# Patient Record
Sex: MALE | Race: WHITE | Employment: FULL TIME | URBAN - NONMETROPOLITAN AREA
[De-identification: names, ages, dates, MRNs, and addresses within clinical notes are randomized per-mention and may not be internally consistent; named-entity substitution may affect disease eponyms.]

---

## 2023-01-29 ENCOUNTER — HOSPITAL ENCOUNTER (EMERGENCY)
Age: 43
Discharge: HOME | End: 2023-01-30

## 2023-01-29 VITALS
WEIGHT: 200 LBS | BODY MASS INDEX: 29.62 KG/M2 | DIASTOLIC BLOOD PRESSURE: 88 MMHG | RESPIRATION RATE: 18 BRPM | HEART RATE: 97 BPM | TEMPERATURE: 98.7 F | SYSTOLIC BLOOD PRESSURE: 149 MMHG | OXYGEN SATURATION: 97 % | HEIGHT: 69 IN

## 2023-01-29 DIAGNOSIS — L03.115 CELLULITIS OF RIGHT LOWER EXTREMITY: Primary | ICD-10-CM

## 2023-01-29 LAB
ALBUMIN SERPL BCG-MCNC: 4.1 G/DL (ref 3.5–5.1)
ALP SERPL-CCNC: 79 U/L (ref 38–126)
ALT SERPL W/O P-5'-P-CCNC: 11 U/L (ref 11–66)
ANION GAP SERPL CALC-SCNC: 9 MEQ/L (ref 8–16)
AST SERPL-CCNC: 16 U/L (ref 5–40)
BASOPHILS ABSOLUTE: 0 THOU/MM3 (ref 0–0.1)
BASOPHILS NFR BLD AUTO: 0.4 %
BILIRUB SERPL-MCNC: < 0.2 MG/DL (ref 0.3–1.2)
BUN SERPL-MCNC: 15 MG/DL (ref 7–22)
CALCIUM SERPL-MCNC: 9.1 MG/DL (ref 8.5–10.5)
CHLORIDE SERPL-SCNC: 101 MEQ/L (ref 98–111)
CO2 SERPL-SCNC: 28 MEQ/L (ref 23–33)
CREAT SERPL-MCNC: 0.7 MG/DL (ref 0.4–1.2)
CRP SERPL-MCNC: 1.27 MG/DL (ref 0–1)
DEPRECATED RDW RBC AUTO: 40.1 FL (ref 35–45)
EOSINOPHIL NFR BLD AUTO: 5.3 %
EOSINOPHILS ABSOLUTE: 0.5 THOU/MM3 (ref 0–0.4)
ERYTHROCYTE [DISTWIDTH] IN BLOOD BY AUTOMATED COUNT: 13 % (ref 11.5–14.5)
GFR SERPL CREATININE-BSD FRML MDRD: > 60 ML/MIN/1.73M2
GLUCOSE SERPL-MCNC: 130 MG/DL (ref 70–108)
HCT VFR BLD AUTO: 41.9 % (ref 42–52)
HGB BLD-MCNC: 13.8 GM/DL (ref 14–18)
IMM GRANULOCYTES # BLD AUTO: 0.03 THOU/MM3 (ref 0–0.07)
IMM GRANULOCYTES NFR BLD AUTO: 0.4 %
LYMPHOCYTES ABSOLUTE: 1.8 THOU/MM3 (ref 1–4.8)
LYMPHOCYTES NFR BLD AUTO: 20.4 %
MCH RBC QN AUTO: 28.2 PG (ref 26–33)
MCHC RBC AUTO-ENTMCNC: 32.9 GM/DL (ref 32.2–35.5)
MCV RBC AUTO: 85.7 FL (ref 80–94)
MONOCYTES ABSOLUTE: 0.5 THOU/MM3 (ref 0.4–1.3)
MONOCYTES NFR BLD AUTO: 6.3 %
NEUTROPHILS NFR BLD AUTO: 67.2 %
NRBC BLD AUTO-RTO: 0 /100 WBC
OSMOLALITY SERPL CALC.SUM OF ELEC: 278.3 MOSMOL/KG (ref 275–300)
PLATELET # BLD AUTO: 240 THOU/MM3 (ref 130–400)
PMV BLD AUTO: 9.6 FL (ref 9.4–12.4)
POTASSIUM SERPL-SCNC: 3.9 MEQ/L (ref 3.5–5.2)
PROCALCITONIN SERPL IA-MCNC: 0.04 NG/ML (ref 0.01–0.09)
PROT SERPL-MCNC: 6.7 G/DL (ref 6.1–8)
RBC # BLD AUTO: 4.89 MILL/MM3 (ref 4.7–6.1)
SEGMENTED NEUTROPHILS ABSOLUTE COUNT: 5.8 THOU/MM3 (ref 1.8–7.7)
SODIUM SERPL-SCNC: 138 MEQ/L (ref 135–145)
URATE SERPL-MCNC: 5 MG/DL (ref 3.7–7)
WBC # BLD AUTO: 8.6 THOU/MM3 (ref 4.8–10.8)

## 2023-01-29 PROCEDURE — 85025 COMPLETE CBC W/AUTO DIFF WBC: CPT

## 2023-01-29 PROCEDURE — 85651 RBC SED RATE NONAUTOMATED: CPT

## 2023-01-29 PROCEDURE — 80053 COMPREHEN METABOLIC PANEL: CPT

## 2023-01-29 PROCEDURE — 96375 TX/PRO/DX INJ NEW DRUG ADDON: CPT

## 2023-01-29 PROCEDURE — 84550 ASSAY OF BLOOD/URIC ACID: CPT

## 2023-01-29 PROCEDURE — 84145 PROCALCITONIN (PCT): CPT

## 2023-01-29 PROCEDURE — 2580000003 HC RX 258: Performed by: PHYSICIAN ASSISTANT

## 2023-01-29 PROCEDURE — 86140 C-REACTIVE PROTEIN: CPT

## 2023-01-29 PROCEDURE — 6360000002 HC RX W HCPCS: Performed by: PHYSICIAN ASSISTANT

## 2023-01-29 PROCEDURE — 99284 EMERGENCY DEPT VISIT MOD MDM: CPT

## 2023-01-29 PROCEDURE — 36415 COLL VENOUS BLD VENIPUNCTURE: CPT

## 2023-01-29 RX ORDER — KETOROLAC TROMETHAMINE 30 MG/ML
30 INJECTION, SOLUTION INTRAMUSCULAR; INTRAVENOUS ONCE
Status: COMPLETED | OUTPATIENT
Start: 2023-01-29 | End: 2023-01-29

## 2023-01-29 RX ORDER — 0.9 % SODIUM CHLORIDE 0.9 %
1000 INTRAVENOUS SOLUTION INTRAVENOUS ONCE
Status: COMPLETED | OUTPATIENT
Start: 2023-01-29 | End: 2023-01-30

## 2023-01-29 RX ORDER — DEXAMETHASONE SODIUM PHOSPHATE 4 MG/ML
10 INJECTION, SOLUTION INTRA-ARTICULAR; INTRALESIONAL; INTRAMUSCULAR; INTRAVENOUS; SOFT TISSUE ONCE
Status: COMPLETED | OUTPATIENT
Start: 2023-01-29 | End: 2023-01-29

## 2023-01-29 RX ADMIN — DEXAMETHASONE SODIUM PHOSPHATE 10 MG: 4 INJECTION, SOLUTION INTRAMUSCULAR; INTRAVENOUS at 23:09

## 2023-01-29 RX ADMIN — SODIUM CHLORIDE 1000 ML: 9 INJECTION, SOLUTION INTRAVENOUS at 23:09

## 2023-01-29 RX ADMIN — KETOROLAC TROMETHAMINE 30 MG: 30 INJECTION, SOLUTION INTRAMUSCULAR; INTRAVENOUS at 23:09

## 2023-01-29 ASSESSMENT — ENCOUNTER SYMPTOMS
VOMITING: 0
SHORTNESS OF BREATH: 0
PHOTOPHOBIA: 0
RHINORRHEA: 0
COLOR CHANGE: 1
NAUSEA: 0

## 2023-01-29 ASSESSMENT — PAIN DESCRIPTION - LOCATION: LOCATION: FOOT

## 2023-01-29 ASSESSMENT — PAIN DESCRIPTION - ORIENTATION: ORIENTATION: RIGHT

## 2023-01-29 ASSESSMENT — PAIN - FUNCTIONAL ASSESSMENT: PAIN_FUNCTIONAL_ASSESSMENT: 0-10

## 2023-01-29 ASSESSMENT — PAIN SCALES - GENERAL: PAINLEVEL_OUTOF10: 10

## 2023-01-29 NOTE — Clinical Note
Felix Aguilar was seen and treated in our emergency department on 1/29/2023. He may return to work on 02/02/2023. If you have any questions or concerns, please don't hesitate to call.       Brittney Fernandez PA-C

## 2023-01-30 LAB — ERYTHROCYTE [SEDIMENTATION RATE] IN BLOOD BY WESTERGREN METHOD: 13 MM/HR (ref 0–10)

## 2023-01-30 PROCEDURE — 6360000002 HC RX W HCPCS: Performed by: PHYSICIAN ASSISTANT

## 2023-01-30 PROCEDURE — 96375 TX/PRO/DX INJ NEW DRUG ADDON: CPT

## 2023-01-30 PROCEDURE — 96365 THER/PROPH/DIAG IV INF INIT: CPT

## 2023-01-30 RX ORDER — MORPHINE SULFATE 4 MG/ML
4 INJECTION, SOLUTION INTRAMUSCULAR; INTRAVENOUS ONCE
Status: COMPLETED | OUTPATIENT
Start: 2023-01-30 | End: 2023-01-30

## 2023-01-30 RX ORDER — CEPHALEXIN 500 MG/1
500 CAPSULE ORAL 4 TIMES DAILY
Qty: 40 CAPSULE | Refills: 0 | Status: SHIPPED | OUTPATIENT
Start: 2023-01-30 | End: 2023-02-09

## 2023-01-30 RX ORDER — CEFAZOLIN SODIUM 2 G/100ML
2000 INJECTION, SOLUTION INTRAVENOUS ONCE
Status: DISCONTINUED | OUTPATIENT
Start: 2023-01-30 | End: 2023-01-30 | Stop reason: CLARIF

## 2023-01-30 RX ORDER — ONDANSETRON 2 MG/ML
4 INJECTION INTRAMUSCULAR; INTRAVENOUS ONCE
Status: COMPLETED | OUTPATIENT
Start: 2023-01-30 | End: 2023-01-30

## 2023-01-30 RX ORDER — SULFAMETHOXAZOLE AND TRIMETHOPRIM 800; 160 MG/1; MG/1
1 TABLET ORAL 2 TIMES DAILY
Qty: 20 TABLET | Refills: 0 | Status: SHIPPED | OUTPATIENT
Start: 2023-01-30 | End: 2023-02-09

## 2023-01-30 RX ORDER — HYDROCODONE BITARTRATE AND ACETAMINOPHEN 5; 325 MG/1; MG/1
1 TABLET ORAL EVERY 6 HOURS PRN
Qty: 15 TABLET | Refills: 0 | Status: SHIPPED | OUTPATIENT
Start: 2023-01-30 | End: 2023-02-04 | Stop reason: ALTCHOICE

## 2023-01-30 RX ADMIN — ONDANSETRON 4 MG: 2 INJECTION INTRAMUSCULAR; INTRAVENOUS at 00:17

## 2023-01-30 RX ADMIN — MORPHINE SULFATE 4 MG: 4 INJECTION, SOLUTION INTRAMUSCULAR; INTRAVENOUS at 00:17

## 2023-01-30 RX ADMIN — Medication 2000 MG: at 00:19

## 2023-01-30 NOTE — ED PROVIDER NOTES
[address] [facility] [address] EMERGENCY DEPT      Pt Name: Madhuri Noe  MRN: [id number]  [place] 1980  Date of evaluation: 1/29/2023  Provider: Mari Perez PA-C    CHIEF COMPLAINT       Chief Complaint   Patient presents with    Foot Swelling       Nurses Notes reviewed and I agree except as noted in the HPI. HISTORY OF PRESENT ILLNESS    Madhuri Noe is a 38 y.o. male who presents for right foot pain and swelling. Pain and swelling started 8 days ago originating in the first MTP joint. Yesterday the foot has become red and more swollen. Nurses that works with advised him to come to the ER for evaluation. Patient has been taking ibuprofen 800 mg regularly for the past few days with only minimal improvement. He is also been elevating and icing. Patient denies any injury or prior history of gout. Patient denies foreign body. Patient denies fever, chills, nausea, vomiting, paresthesias, or other complaints. REVIEW OF SYSTEMS     Review of Systems   Constitutional:  Negative for appetite change, chills, diaphoresis and fever. HENT:  Negative for rhinorrhea. Eyes:  Negative for photophobia. Respiratory:  Negative for shortness of breath. Cardiovascular:  Positive for leg swelling. Negative for chest pain. Gastrointestinal:  Negative for nausea and vomiting. Musculoskeletal:  Positive for arthralgias and gait problem. Skin:  Positive for color change. Negative for rash and wound. Neurological:  Negative for weakness and numbness. Psychiatric/Behavioral:  Negative for confusion. PAST MEDICAL HISTORY    has no past medical history on file. SURGICAL HISTORY      has a past surgical history that includes Tympanostomy tube placement.     CURRENT MEDICATIONS       Previous Medications    ALBUTEROL SULFATE HFA (PROVENTIL HFA) 108 (90 BASE) MCG/ACT INHALER    Inhale 2 puffs into the lungs every 4 hours as needed for Wheezing or Shortness of Breath (Space out to every 6 hours as symptoms improve) Space out to every 6 hours as symptoms improve. GABAPENTIN (NEURONTIN) 100 MG CAPSULE    Take 3 capsules by mouth 2 times daily       ALLERGIES     has No Known Allergies. FAMILY HISTORY     He indicated that his mother is alive. He indicated that his father is alive. family history includes Emphysema in his mother; High Blood Pressure in his father; High Cholesterol in his father. SOCIAL HISTORY    reports that he has been smoking cigarettes. He has been smoking an average of 1 pack per day. He has never used smokeless tobacco. He reports that he does not drink alcohol and does not use drugs. PHYSICAL EXAM     INITIAL VITALS:  height is 5' 9\" (1.753 m) and weight is 200 lb (90.7 kg). His oral temperature is 98.7 °F (37.1 °C). His blood pressure is 149/88 (abnormal) and his pulse is 97. His respiration is 18 and oxygen saturation is 97%. Physical Exam  Constitutional:       General: He is not in acute distress. Appearance: He is well-developed. He is not toxic-appearing. HENT:      Head: Normocephalic and atraumatic. Right Ear: Hearing normal.      Left Ear: Hearing normal.      Nose: Nose normal. No nasal deformity. Mouth/Throat:      Mouth: Mucous membranes are moist.      Pharynx: Oropharynx is clear. Eyes:      General: Lids are normal.      Extraocular Movements: Extraocular movements intact. Conjunctiva/sclera: Conjunctivae normal.   Neck:      Trachea: Trachea normal. No tracheal deviation. Cardiovascular:      Rate and Rhythm: Normal rate and regular rhythm. Pulses:           Dorsalis pedis pulses are 2+ on the right side and 2+ on the left side. Posterior tibial pulses are 2+ on the right side and 2+ on the left side. Pulmonary:      Effort: Pulmonary effort is normal. No tachypnea. Abdominal:      General: There is no distension. Palpations: Abdomen is soft. Musculoskeletal:      Cervical back: No rigidity.       Right lower leg: Swelling present. No tenderness. Right ankle: Swelling present. No tenderness. Normal range of motion. Right foot: Decreased range of motion. Swelling, tenderness and bony tenderness present. Left foot: Normal.        Feet:       Comments: Patient has swelling and scattered erythema to the right foot with erythema predominantly to the dorsum. The only tenderness elicited is over the first MTP joint. Swelling extends up to the calf. Skin:     General: Skin is warm and dry. Capillary Refill: Capillary refill takes less than 2 seconds. Coloration: Skin is not pale. Findings: No rash. Neurological:      Mental Status: He is alert. GCS: GCS eye subscore is 4. GCS verbal subscore is 5. GCS motor subscore is 6. Sensory: No sensory deficit. Motor: No weakness. Coordination: Coordination normal.      Gait: Gait normal.   Psychiatric:         Speech: Speech normal.         Behavior: Behavior normal. Behavior is cooperative. Thought Content: Thought content normal.             DIFFERENTIAL DIAGNOSIS:   Including but not limited to: Gout, Cellulitis, sprain, venous stasis    Diagnoses Considered but I have low suspicion of:   DVT, fracture, foreign body, osteomyelitis    DIAGNOSTIC RESULTS     EKG: All E[facility]'s are [facility] who either signs or Co-signs this chart in the absence of a cardiologist.  None    RADIOLOGY: non-plain film images(s) such as CT,Ultrasound and MRI are read by the radiologist.  Plain radiographic images are visualized and preliminarily interpreted by the emergency physician unless otherwise stated below.   No orders to display       LABS:   Labs Reviewed   CBC WITH AUTO DIFFERENTIAL - Abnormal; Notable for the following components:       Result Value    Hemoglobin 13.8 (*)     Hematocrit 41.9 (*)     Eosinophils Absolute 0.5 (*)     All other components within normal limits   COMPREHENSIVE METABOLIC PANEL - Abnormal; Notable for the following components:    Glucose 130 (*)     Total Bilirubin <0.2 (*)     All other components within normal limits   SEDIMENTATION RATE - Abnormal; Notable for the following components:    Sed Rate 13 (*)     All other components within normal limits   C-REACTIVE PROTEIN - Abnormal; Notable for the following components:    CRP 1.27 (*)     All other components within normal limits   URIC ACID   PROCALCITONIN   ANION GAP   GLOMERULAR FILTRATION RATE, ESTIMATED   OSMOLALITY       EMERGENCY DEPARTMENT COURSE:   Vitals:    Vitals:    01/29/23 2106   BP: (!) 149/88   Pulse: 97   Resp: 18   Temp: 98.7 °F (37.1 °C)   TempSrc: Oral   SpO2: 97%   Weight: 200 lb (90.7 kg)   Height: 5' 9\" (1.753 m)       MDM:  The patient was seen and evaluated by me in the intake area. Vital signs were reviewed and noted stable. Physical exam revealed swollen and erythematous right foot with swelling extending up to the calf. The only tenderness elicited was over the first MTP joint. The patient was neurovascularly intact. Loreatha Press Appropriate testing was ordered. Results were reviewed by me upon completion. Results showed normal white count and uric acid level. Results were discussed with the patient and discharge plan was discussed. Patient was medicated with Toradol and Decadron initially for possible gout. Patient had minimal improvement at best for pain. I discussed this patient with my attending physician Dr. Mead who advised we should treat this as cellulitis. Patient was given Ancef, morphine and Zofran. Crutches were supplied with instructions. Patient was comfortable plan of discharge home to follow-up with podiatry or at [facility]. Anticipatory guidance was given. I have given the patient strict written and verbal instructions about care at home, follow-up, and signs and symptoms of worsening of condition and they did verbalize understanding.     CRITICAL CARE: None    CONSULTS:  None    PROCEDURES:  None    FINAL IMPRESSION      1. Cellulitis of right lower extremity          DISPOSITION/PLAN     1. Cellulitis of right lower extremity        PATIENT REFERRED TO:  [address] [address] [facility] [address]  Schedule an appointment as soon as possible for a visit   Please arrive 15 minutes early for paperwork. DEEPAK Blanton  [address] [address] [phone]    Schedule an appointment as soon as possible for a visit       DISCHARGE MEDICATIONS:  New Prescriptions    CEPHALEXIN (KEFLEX) 500 MG CAPSULE    Take 1 capsule by mouth 4 times daily for 10 days    HYDROCODONE-ACETAMINOPHEN (NORCO) 5-325 MG PER TABLET    Take 1 tablet by mouth every 6 hours as needed for Pain for up to 5 days. Intended supply: 3 days.  Take lowest dose possible to manage pain Max Daily Amount: 4 tablets    SULFAMETHOXAZOLE-TRIMETHOPRIM (BACTRIM DS) 800-160 MG PER TABLET    Take 1 tablet by mouth 2 times daily for 10 days       (Please note that portions of this note were completed with a voice recognition program.  Efforts were made to edit the dictations but occasionally words are mis-transcribed.)    Jazmin Mcdaniel PA-C 01/30/23 12:23 AM    DEUCE Broussard PA-C  01/30/23 0023

## 2023-01-30 NOTE — ED NOTES
Pt presents to the ED with complaint of right leg swelling. Pt states swelling and pain began 8 days ago. Pt expressed concern for gout. Pt denies an injury. Pt R leg is swollen and red.       Luz Keller RN  01/29/23 5896

## 2023-02-04 ENCOUNTER — HOSPITAL ENCOUNTER (EMERGENCY)
Age: 43
Discharge: HOME | End: 2023-02-04

## 2023-02-04 ENCOUNTER — APPOINTMENT (OUTPATIENT)
Dept: INTERVENTIONAL RADIOLOGY/VASCULAR | Age: 43
End: 2023-02-04

## 2023-02-04 ENCOUNTER — APPOINTMENT (OUTPATIENT)
Dept: GENERAL RADIOLOGY | Age: 43
End: 2023-02-04

## 2023-02-04 VITALS
OXYGEN SATURATION: 98 % | SYSTOLIC BLOOD PRESSURE: 119 MMHG | HEART RATE: 84 BPM | RESPIRATION RATE: 16 BRPM | TEMPERATURE: 98.2 F | HEIGHT: 69 IN | DIASTOLIC BLOOD PRESSURE: 85 MMHG | BODY MASS INDEX: 29.62 KG/M2 | WEIGHT: 200 LBS

## 2023-02-04 DIAGNOSIS — I82.461 DEEP VEIN THROMBOSIS (DVT) OF CALF MUSCLE VEIN OF RIGHT LOWER EXTREMITY, UNSPECIFIED CHRONICITY (HCC): Primary | ICD-10-CM

## 2023-02-04 LAB
ALBUMIN SERPL BCG-MCNC: 4.3 G/DL (ref 3.5–5.1)
ALP SERPL-CCNC: 83 U/L (ref 38–126)
ALT SERPL W/O P-5'-P-CCNC: 18 U/L (ref 11–66)
ANION GAP SERPL CALC-SCNC: 8 MEQ/L (ref 8–16)
AST SERPL-CCNC: 16 U/L (ref 5–40)
BASOPHILS ABSOLUTE: 0 THOU/MM3 (ref 0–0.1)
BASOPHILS NFR BLD AUTO: 0.4 %
BILIRUB CONJ SERPL-MCNC: < 0.2 MG/DL (ref 0–0.3)
BILIRUB SERPL-MCNC: 0.2 MG/DL (ref 0.3–1.2)
BUN SERPL-MCNC: 11 MG/DL (ref 7–22)
CALCIUM SERPL-MCNC: 9 MG/DL (ref 8.5–10.5)
CHLORIDE SERPL-SCNC: 101 MEQ/L (ref 98–111)
CO2 SERPL-SCNC: 24 MEQ/L (ref 23–33)
CREAT SERPL-MCNC: 0.7 MG/DL (ref 0.4–1.2)
D DIMER PPP IA.FEU-MCNC: 487 NG/ML FEU (ref 0–500)
DEPRECATED RDW RBC AUTO: 40.5 FL (ref 35–45)
EOSINOPHIL NFR BLD AUTO: 4.6 %
EOSINOPHILS ABSOLUTE: 0.3 THOU/MM3 (ref 0–0.4)
ERYTHROCYTE [DISTWIDTH] IN BLOOD BY AUTOMATED COUNT: 13.2 % (ref 11.5–14.5)
GFR SERPL CREATININE-BSD FRML MDRD: > 60 ML/MIN/1.73M2
GLUCOSE SERPL-MCNC: 105 MG/DL (ref 70–108)
HCT VFR BLD AUTO: 44.6 % (ref 42–52)
HGB BLD-MCNC: 14.5 GM/DL (ref 14–18)
IMM GRANULOCYTES # BLD AUTO: 0.03 THOU/MM3 (ref 0–0.07)
IMM GRANULOCYTES NFR BLD AUTO: 0.4 %
LACTATE SERPL-SCNC: 1.3 MMOL/L (ref 0.5–2)
LIPASE SERPL-CCNC: 65.1 U/L (ref 5.6–51.3)
LYMPHOCYTES ABSOLUTE: 1.7 THOU/MM3 (ref 1–4.8)
LYMPHOCYTES NFR BLD AUTO: 23.9 %
MCH RBC QN AUTO: 27.8 PG (ref 26–33)
MCHC RBC AUTO-ENTMCNC: 32.5 GM/DL (ref 32.2–35.5)
MCV RBC AUTO: 85.6 FL (ref 80–94)
MONOCYTES ABSOLUTE: 0.4 THOU/MM3 (ref 0.4–1.3)
MONOCYTES NFR BLD AUTO: 5.2 %
NEUTROPHILS NFR BLD AUTO: 65.5 %
NRBC BLD AUTO-RTO: 0 /100 WBC
OSMOLALITY SERPL CALC.SUM OF ELEC: 266.1 MOSMOL/KG (ref 275–300)
PLATELET # BLD AUTO: 290 THOU/MM3 (ref 130–400)
PMV BLD AUTO: 9.3 FL (ref 9.4–12.4)
POTASSIUM SERPL-SCNC: 4.3 MEQ/L (ref 3.5–5.2)
PROCALCITONIN SERPL IA-MCNC: 0.04 NG/ML (ref 0.01–0.09)
PROT SERPL-MCNC: 6.7 G/DL (ref 6.1–8)
RBC # BLD AUTO: 5.21 MILL/MM3 (ref 4.7–6.1)
REASON FOR REJECTION: NORMAL
REJECTED TEST: NORMAL
SEGMENTED NEUTROPHILS ABSOLUTE COUNT: 4.7 THOU/MM3 (ref 1.8–7.7)
SODIUM SERPL-SCNC: 133 MEQ/L (ref 135–145)
WBC # BLD AUTO: 7.2 THOU/MM3 (ref 4.8–10.8)

## 2023-02-04 PROCEDURE — 6360000002 HC RX W HCPCS: Performed by: NURSE PRACTITIONER

## 2023-02-04 PROCEDURE — 85379 FIBRIN DEGRADATION QUANT: CPT

## 2023-02-04 PROCEDURE — 84145 PROCALCITONIN (PCT): CPT

## 2023-02-04 PROCEDURE — 80053 COMPREHEN METABOLIC PANEL: CPT

## 2023-02-04 PROCEDURE — 99284 EMERGENCY DEPT VISIT MOD MDM: CPT

## 2023-02-04 PROCEDURE — 82248 BILIRUBIN DIRECT: CPT

## 2023-02-04 PROCEDURE — 83690 ASSAY OF LIPASE: CPT

## 2023-02-04 PROCEDURE — 87040 BLOOD CULTURE FOR BACTERIA: CPT

## 2023-02-04 PROCEDURE — 83605 ASSAY OF LACTIC ACID: CPT

## 2023-02-04 PROCEDURE — 85025 COMPLETE CBC W/AUTO DIFF WBC: CPT

## 2023-02-04 PROCEDURE — 96375 TX/PRO/DX INJ NEW DRUG ADDON: CPT

## 2023-02-04 PROCEDURE — 2580000003 HC RX 258: Performed by: NURSE PRACTITIONER

## 2023-02-04 PROCEDURE — 96374 THER/PROPH/DIAG INJ IV PUSH: CPT

## 2023-02-04 PROCEDURE — 73610 X-RAY EXAM OF ANKLE: CPT

## 2023-02-04 PROCEDURE — 36415 COLL VENOUS BLD VENIPUNCTURE: CPT

## 2023-02-04 PROCEDURE — 6370000000 HC RX 637 (ALT 250 FOR IP)

## 2023-02-04 PROCEDURE — 73630 X-RAY EXAM OF FOOT: CPT

## 2023-02-04 PROCEDURE — 93971 EXTREMITY STUDY: CPT

## 2023-02-04 RX ORDER — TRAMADOL HYDROCHLORIDE 50 MG/1
50 TABLET ORAL EVERY 6 HOURS PRN
Qty: 12 TABLET | Refills: 0 | Status: SHIPPED | OUTPATIENT
Start: 2023-02-04 | End: 2023-02-07

## 2023-02-04 RX ORDER — ONDANSETRON 2 MG/ML
4 INJECTION INTRAMUSCULAR; INTRAVENOUS ONCE
Status: COMPLETED | OUTPATIENT
Start: 2023-02-04 | End: 2023-02-04

## 2023-02-04 RX ORDER — 0.9 % SODIUM CHLORIDE 0.9 %
1000 INTRAVENOUS SOLUTION INTRAVENOUS ONCE
Status: COMPLETED | OUTPATIENT
Start: 2023-02-04 | End: 2023-02-04

## 2023-02-04 RX ORDER — OXYCODONE HYDROCHLORIDE AND ACETAMINOPHEN 5; 325 MG/1; MG/1
1 TABLET ORAL
Status: COMPLETED | OUTPATIENT
Start: 2023-02-04 | End: 2023-02-04

## 2023-02-04 RX ORDER — MORPHINE SULFATE 4 MG/ML
4 INJECTION, SOLUTION INTRAMUSCULAR; INTRAVENOUS ONCE
Status: COMPLETED | OUTPATIENT
Start: 2023-02-04 | End: 2023-02-04

## 2023-02-04 RX ORDER — MORPHINE SULFATE 4 MG/ML
4 INJECTION, SOLUTION INTRAMUSCULAR; INTRAVENOUS ONCE
Status: DISCONTINUED | OUTPATIENT
Start: 2023-02-04 | End: 2023-02-04

## 2023-02-04 RX ADMIN — SODIUM CHLORIDE 1000 ML: 900 INJECTION INTRAVENOUS at 02:57

## 2023-02-04 RX ADMIN — OXYCODONE AND ACETAMINOPHEN 1 TABLET: 5; 325 TABLET ORAL at 10:09

## 2023-02-04 RX ADMIN — ONDANSETRON 4 MG: 2 INJECTION INTRAMUSCULAR; INTRAVENOUS at 03:29

## 2023-02-04 RX ADMIN — MORPHINE SULFATE 4 MG: 4 INJECTION, SOLUTION INTRAMUSCULAR; INTRAVENOUS at 03:30

## 2023-02-04 ASSESSMENT — PAIN DESCRIPTION - ORIENTATION: ORIENTATION: RIGHT

## 2023-02-04 ASSESSMENT — PAIN - FUNCTIONAL ASSESSMENT: PAIN_FUNCTIONAL_ASSESSMENT: 0-10

## 2023-02-04 ASSESSMENT — PAIN DESCRIPTION - FREQUENCY: FREQUENCY: CONTINUOUS

## 2023-02-04 ASSESSMENT — PAIN SCALES - GENERAL
PAINLEVEL_OUTOF10: 9
PAINLEVEL_OUTOF10: 10
PAINLEVEL_OUTOF10: 10

## 2023-02-04 ASSESSMENT — PAIN DESCRIPTION - LOCATION: LOCATION: FOOT

## 2023-02-04 NOTE — Clinical Note
Rachelle Palencia was seen and treated in our emergency department on 2/4/2023. He may return to work on 02/06/2023. If you have any questions or concerns, please don't hesitate to call.       Ryan Henley, APRN - CNP

## 2023-02-04 NOTE — ED PROVIDER NOTES
[address] [facility]"" [address] [address] EMERGENCY DEPT      EMERGENCY MEDICINE     Pt Name: Ruma West  MRN: [id number]  [place] 1980  Date of evaluation: 2/4/2023  Provider: KEVIN Castañeda CNP    CHIEF COMPLAINT       Chief Complaint   Patient presents with    Cellulitis     Right foot     HISTORY OF PRESENT ILLNESS   Ruma West is a pleasant 38 y.o. male who presents to the emergency department from home with c/o right lower extremity swelling, redness, pain. Patient was seen here on Sunday diagnosis cellulitis. Sent home on double antibiotic coverage. Patient states that he has been icing, taking the antibiotics, elevating, using pain medication and pain has not gotten any better and the redness is much worse. Patient states the leg is much more swollen. Denies fever. Limited range of motion secondary to pain. Pain with palpation or movement. History is obtained from:  patient  PASTMEDICAL HISTORY   History reviewed. No pertinent past medical history. There is no problem list on file for this patient. SURGICAL HISTORY       Past Surgical History:   Procedure Laterality Date    TYMPANOSTOMY TUBE PLACEMENT         CURRENT MEDICATIONS       Previous Medications    ALBUTEROL SULFATE HFA (PROVENTIL HFA) 108 (90 BASE) MCG/ACT INHALER    Inhale 2 puffs into the lungs every 4 hours as needed for Wheezing or Shortness of Breath (Space out to every 6 hours as symptoms improve) Space out to every 6 hours as symptoms improve. CEPHALEXIN (KEFLEX) 500 MG CAPSULE    Take 1 capsule by mouth 4 times daily for 10 days    GABAPENTIN (NEURONTIN) 100 MG CAPSULE    Take 3 capsules by mouth 2 times daily    HYDROCODONE-ACETAMINOPHEN (NORCO) 5-325 MG PER TABLET    Take 1 tablet by mouth every 6 hours as needed for Pain for up to 5 days. Intended supply: 3 days.  Take lowest dose possible to manage pain Max Daily Amount: 4 tablets    SULFAMETHOXAZOLE-TRIMETHOPRIM (BACTRIM DS) 800-160 MG PER TABLET    Take 1 tablet by mouth 2 times daily for 10 days       ALLERGIES     has No Known Allergies. FAMILY HISTORY     He indicated that his mother is alive. He indicated that his father is alive. SOCIAL HISTORY       Social History     Tobacco Use    Smoking status: Every Day     Packs/day: 1.00     Types: Cigarettes    Smokeless tobacco: Never   Substance Use Topics    Alcohol use: No    Drug use: No       PHYSICAL EXAM       ED Triage Vitals [02/04/23 0153]   BP Temp Temp Source Heart Rate Resp SpO2 Height Weight   (!) 160/90 98.2 °F (36.8 °C) Oral 92 18 97 % 5' 9\" (1.753 m) 200 lb (90.7 kg)       Physical Exam  Vitals and nursing note reviewed. Constitutional:       General: He is not in acute distress. Appearance: Normal appearance. He is well-developed. He is not ill-appearing or diaphoretic. HENT:      Head: Normocephalic and atraumatic. Nose: Nose normal.      Mouth/Throat:      Mouth: Mucous membranes are moist.      Pharynx: Oropharynx is clear. Eyes:      General:         Right eye: No discharge. Left eye: No discharge. Conjunctiva/sclera: Conjunctivae normal.   Neck:      Trachea: No tracheal deviation. Cardiovascular:      Rate and Rhythm: Normal rate and regular rhythm. Pulses: Normal pulses. Heart sounds: Normal heart sounds. No murmur heard. No gallop. Comments: Normal capillary refill  Pulmonary:      Effort: Pulmonary effort is normal. No respiratory distress. Breath sounds: Normal breath sounds. No stridor. Abdominal:      General: Bowel sounds are normal.      Palpations: Abdomen is soft. Musculoskeletal:         General: Swelling and tenderness present. No deformity. Normal range of motion. Cervical back: Normal range of motion. Legs:    Skin:     General: Skin is warm and dry. Capillary Refill: Capillary refill takes less than 2 seconds. Coloration: Skin is not pale. Findings: Erythema present. No rash.    Neurological: General: No focal deficit present. Mental Status: He is alert and oriented to person, place, and time. Cranial Nerves: No cranial nerve deficit. Psychiatric:         Behavior: Behavior normal.       FORMAL DIAGNOSTIC RESULTS     RADIOLOGY: Interpretation per the Radiologist below, if available at the time of this note (none if blank):    XR FOOT RIGHT (MIN 3 VIEWS)   Final Result   Impression:   Negative for acute osseous abnormality. This document has been electronically signed by: Yessenia Quintana MD on    02/04/2023 02:59 AM      XR ANKLE RIGHT (MIN 3 VIEWS)   Final Result   Impression:   Negative for acute osseous abnormality. Diffuse soft tissue swelling. This document has been electronically signed by: Yessenia Quintana MD on    02/04/2023 03:01 AM      VL DUP LOWER EXTREMITY VENOUS RIGHT    (Results Pending)       LABS: (none if blank)  Labs Reviewed   CBC WITH AUTO DIFFERENTIAL - Abnormal; Notable for the following components:       Result Value    MPV 9.3 (*)     All other components within normal limits   BASIC METABOLIC PANEL - Abnormal; Notable for the following components:    Sodium 133 (*)     All other components within normal limits   HEPATIC FUNCTION PANEL - Abnormal; Notable for the following components:     Total Bilirubin 0.2 (*)     All other components within normal limits   LIPASE - Abnormal; Notable for the following components:    Lipase 65.1 (*)     All other components within normal limits   OSMOLALITY - Abnormal; Notable for the following components:    Osmolality Calc 266.1 (*)     All other components within normal limits   CULTURE, BLOOD 1   CULTURE, BLOOD 2   LACTIC ACID   D-DIMER, QUANTITATIVE   SPECIMEN REJECTION   PROCALCITONIN   ANION GAP   GLOMERULAR FILTRATION RATE, ESTIMATED       (Any cultures that may have been sent were not resulted at the time of this patient visit)    [address] / ED COURSE:     Summary of Patient Presentation:      1) Number and Complexity of Problems            Problem List This Visit:         Chief Complaint   Patient presents with    Cellulitis     Right foot             Differential Diagnosis includes (but not limited to):  Cellulitis, DVT, fracture, septic arthritis            2)  Data Reviewed (none if left blank, additional information can be found in the ED course)          My Independent interpretations:     EKG:           Imaging: xrays are negative. Venous duplex is pending    Labs:     lab's are reassuring. No big white count. Vital signs are stable                 Decision Rules/Clinical Scores utilized:                            External Documentation Reviewed:         Previous patient encounter documents & history available on EMR was reviewed              See Formal Diagnostic Results above for the lab and radiology tests and orders. ED Course as of 02/04/23 0618   Sat Feb 04, 2023   0411 D/W Dr. Polo Pryor. We will hold the patient til morning for a DVT study [KJ]      ED Course User Index  [KJ] KEVIN Constantino CNP       3)  Treatment and Disposition         ED Reassessment: Patient is updated on lab work. He is advised that we can await and do a venous Doppler once the ultrasound tech gets here. Patient verbalized understanding         Case discussed with consulting clinician/attending physician:            Shared Decision-Making was performed and disposition discussed with the       Patient/Family and questions answered          Social determinants of health impacting treatment or disposition:            Code Status:  Full        MDM    Vitals Reviewed:    Vitals:    02/04/23 0153 02/04/23 0522   BP: (!) 160/90 121/79   Pulse: 92    Resp: 18    Temp: 98.2 °F (36.8 °C)    TempSrc: Oral    SpO2: 97% 99%   Weight: 200 lb (90.7 kg)    Height: 5' 9\" (1.753 m)        The patient was seen and examined. Appropriate diagnostic testing was performed and results reviewed with the patient.   Work was reassuring and x-rays were negative for fractures. They do show diffuse soft tissue swelling. The end of my shift patient was still pending a DVT study. Care was transferred to [facility], [address] [place] [address]. Please refer to his notes for further evaluation      The results of pertinent diagnostic studies and exam findings were discussed. The patients provisional diagnosis and plan of care were discussed with the patient and present family who expressed understanding and agreement with the POC. Any medications were reviewed and indications and risks of medications were discussed with the patient /family present. Strict verbal and written return precautions, instructions and appropriate follow-up provided to  the patient. Patient was DISCHARGED from the hospital. Based on the reassuring ED workup and patient's stable vital signs, I feel the patient may be safely discharged home. At this point in time, I believe the patient has the mental capacity to make medical decisions. No notes of EC Admission Criteria type on file. Please note that the patient was evaluated during a pandemic. All efforts were made for HIPPA compliance as well as provision of appropriate care. Patient was seen independently by myself. The patient's final impression and disposition and plan was determined by myself. Strict return precautions and follow up instructions were discussed with the patient prior to discharge, with which the patient agrees. Physical assessment findings, diagnostic testing(s) if applicable, and vital signs reviewed with patient/patient representative. Questions answered. Medications asdirected, including OTC medications for supportive care. Education provided on medications. Differential diagnosis(s) discussed with patient/patient representative. Home care/self care instructions reviewed withpatient/patient representative. Patient is to follow-up with family care provider in 2-3 days if no improvement.   Patient is to go to the emergency department if symptoms worsen. Patient/patient representative isaware of care plan, questions answered, verbalizes understanding and is in agreement. ED Medications administered this visit:  (None if blank)  Medications   0.9 % sodium chloride bolus (0 mLs IntraVENous Stopped 2/4/23 0357)   morphine injection 4 mg (4 mg IntraVENous Given 2/4/23 0330)   ondansetron (ZOFRAN) injection 4 mg (4 mg IntraVENous Given 2/4/23 0329)         CONSULTS:  None    PROCEDURES: (None if blank)  Procedures:     CRITICAL CARE: (None if blank)      DISCHARGE PRESCRIPTIONS: (None if blank)  New Prescriptions    No medications on file       FINAL IMPRESSION    No diagnosis found. DISPOSITION/PLAN   DISPOSITION        OUTPATIENT FOLLOW UP THE PATIENT:  No follow-up provider specified.     KEVIN Mathis CNP, APRN - CNP  02/04/23 7269

## 2023-02-04 NOTE — DISCHARGE INSTRUCTIONS
Thanks for coming to see us. As we stated in the ER, please return if you are having any chest pain or shortness of breath. There is a small chance that you may have a clot that migrated to the lung. Please follow-up with the internal medicine visit that I have already scheduled for you on Monday, the 13th. It is important that you receive ongoing care to see why this clot occurred in the first place and to ensure it is treated appropriately and resolved.

## 2023-02-04 NOTE — Clinical Note
Felix Aguilar was seen and treated in our emergency department on 2/4/2023. He may return to work on 02/06/2023. If you have any questions or concerns, please don't hesitate to call.       Chacho Jean-Baptiste, KEVIN - CNP

## 2023-02-04 NOTE — ED TRIAGE NOTES
Patient presents to ED via intake due to cellulitis that has not gotten better due to ATB use. Pt states he was seen last Sunday and dx with cellulitis, and is currently on two different antibiotics + pain medicine. Patient stating pain 10/10. Right foot and leg is red and swollen.

## 2023-02-04 NOTE — ED PROVIDER NOTES
[address]     Pt Name: Amado Antunez  MRN: [id number]  [place] 1980  Date of evaluation: 2/4/23    Mid-level provider Note:    I have personally performed and/or participated in the history, exam and medical decision making and agree with all pertinent clinical information as noted by the previous provider. I have also reviewed and agree with the past medical, family and social history unless otherwise noted. I have personally performed a face to face diagnostic evaluation on this patient. I have reviewed the previous provider's findings and agree. Evaluation: Patient resting comfortably in bed. Pain objectively under control. No apparent distress, respirations unlabeled, VSS. Reevaluation: Patient reports his pain is returning at approximately 0930. Also, he endorses no liver/kidney disease. After discussing findings positive for DVT via Doppler, he prefers to utilize medication that does not require injection. Unfortunately, he does not have insurance and wonders how he will afford it. I called Brenda pharmacist who is going to send a prescription coupon for him and I am providing him with a 30-day supply of Xarelto from the emergency department. He will be following up with internal medicine for ongoing DVT management and work-up for possible rationale of why this DVT occurred in the first place. Risk factors include daily tobacco use. Patient's pain previously treated with 4 mg IV morphine. Had teresa discussion regarding the high likelihood he would be discharged soon. He stated that he drove here but he has his girlfriend that lives around the corner of the come pick him up. Patient request ongoing pain management in the form of \"what I had before\". Patient reassured pain will be under control and ordered 4 mg IV morphine as he stated he would not have to drive anytime soon. I have assumed care from [facility], [facility].   I have reviewed the pertinent results including no significant lab abnormality. No current clot forming/degradation AEB negative D-dimer, however persistent symptoms following ultrasound treatment indicate need for venous Dopplers to identify DVT versus cellulitis, negative right ankle and foot imaging. Results from duplex lower extremity venous ultrasound of right lower extremity were pending. Had extensive discussion regarding anticoagulation choice and options for outpatient DVT management. She agrees with medical care delivered here in emergency department and discharge disposition with follow-up with internal med for ongoing management/work-up identifying etiology of DVT. Also was concerned that he was not having any chest pain or shortness of breath, which he was not. Safe for discharge at this time. Extensive discussion regarding return precautions, especially including chest pain or shortness of breath, nausea vomiting, especially if accompanied with sweating. He verbalized understanding, all questions answered. Teach back performed. ED Course as of 02/04/23 1012   Sat Feb 04, 2023   0411 D/W Dr. Jess Conley. We will hold the patient til morning for a DVT study [KJ]      ED Course User Index  [KJ] KEVIN Chapman - CNP       XR ANKLE RIGHT (MIN 3 VIEWS)    Result Date: 2/4/2023  Right ankle x-ray: 3 views. Indication: Pain and swelling. Comparison: None Findings: No acute fracture or dislocation. The tibiotalar joint is congruent. No widening of the medial or lateral clear spaces. No subcutaneous emphysema or radiopaque foreign body in the soft tissue. Diffuse soft tissue swelling. Small calcification/phlebolith distal calf. Impression: Negative for acute osseous abnormality. Diffuse soft tissue swelling. This document has been electronically signed by: Stacey Beth MD on 02/04/2023 03:01 AM    XR FOOT RIGHT (MIN 3 VIEWS)    Result Date: 2/4/2023  Right foot x-ray: 3 views.  Indication: Pain. Swelling. Comparison: None Findings: No acute fracture or dislocation. Normal bony mineralization. No periosteal reaction. Soft tissue swelling, most pronounced dorsal forefoot. No subcutaneous emphysema or radiopaque foreign body in the soft tissue. Impression: Negative for acute osseous abnormality. This document has been electronically signed by: Angie Harris MD on 02/04/2023 02:59 AM    DIAGNOSIS  1. Deep vein thrombosis (DVT) of calf muscle vein of right lower extremity, unspecified chronicity (HCC)       DISPOSITION/PLAN  PATIENT REFERRED TO:  Internal medicine-Monday, February 13 at 1:30 PM  [address] [facility], [address]  [place]       [facility]  [address]  [address] [place] [address]  [phone]    As needed, If symptoms worsen, especially if you have chest pains or shortness of breath. DISCHARGE MEDICATIONS:  New Prescriptions    RIVAROXABAN (XARELTO) 15 MG TABS TABLET    Take 1 tablet by mouth 2 times daily (with meals)    TRAMADOL (ULTRAM) 50 MG TABLET    Take 1 tablet by mouth every 6 hours as needed for Pain for up to 3 days. Intended supply: 3 days.  Take lowest dose possible to manage pain Max Daily Amount: 200 mg       KEVIN Craig CNP, APRN - CNP  02/04/23 1013

## 2023-02-05 LAB
BACTERIA BLD AEROBE CULT: NORMAL
BACTERIA BLD AEROBE CULT: NORMAL

## 2023-02-09 LAB
BACTERIA BLD AEROBE CULT: NORMAL
BACTERIA BLD AEROBE CULT: NORMAL

## 2023-02-28 ENCOUNTER — HOSPITAL ENCOUNTER (EMERGENCY)
Age: 43
Discharge: HOME | End: 2023-03-01
Attending: STUDENT IN AN ORGANIZED HEALTH CARE EDUCATION/TRAINING PROGRAM

## 2023-02-28 VITALS
SYSTOLIC BLOOD PRESSURE: 135 MMHG | WEIGHT: 200 LBS | RESPIRATION RATE: 18 BRPM | DIASTOLIC BLOOD PRESSURE: 81 MMHG | BODY MASS INDEX: 29.62 KG/M2 | HEIGHT: 69 IN | HEART RATE: 100 BPM | OXYGEN SATURATION: 97 % | TEMPERATURE: 98.7 F

## 2023-02-28 DIAGNOSIS — M19.90 INFLAMMATORY ARTHRITIS: Primary | ICD-10-CM

## 2023-02-28 PROCEDURE — 96374 THER/PROPH/DIAG INJ IV PUSH: CPT

## 2023-02-28 PROCEDURE — 96375 TX/PRO/DX INJ NEW DRUG ADDON: CPT

## 2023-02-28 PROCEDURE — 99284 EMERGENCY DEPT VISIT MOD MDM: CPT

## 2023-02-28 ASSESSMENT — PAIN SCALES - GENERAL: PAINLEVEL_OUTOF10: 10

## 2023-02-28 ASSESSMENT — PAIN DESCRIPTION - LOCATION: LOCATION: FOOT

## 2023-02-28 ASSESSMENT — PAIN - FUNCTIONAL ASSESSMENT: PAIN_FUNCTIONAL_ASSESSMENT: 0-10

## 2023-02-28 ASSESSMENT — PAIN DESCRIPTION - ORIENTATION: ORIENTATION: RIGHT

## 2023-03-01 ENCOUNTER — APPOINTMENT (OUTPATIENT)
Dept: GENERAL RADIOLOGY | Age: 43
End: 2023-03-01

## 2023-03-01 LAB
ALBUMIN SERPL BCG-MCNC: 4.3 G/DL (ref 3.5–5.1)
ALP SERPL-CCNC: 84 U/L (ref 38–126)
ALT SERPL W/O P-5'-P-CCNC: 16 U/L (ref 11–66)
ANION GAP SERPL CALC-SCNC: 11 MEQ/L (ref 8–16)
AST SERPL-CCNC: 15 U/L (ref 5–40)
BASOPHILS ABSOLUTE: 0 THOU/MM3 (ref 0–0.1)
BASOPHILS NFR BLD AUTO: 0.6 %
BILIRUB SERPL-MCNC: 0.2 MG/DL (ref 0.3–1.2)
BUN SERPL-MCNC: 13 MG/DL (ref 7–22)
CALCIUM SERPL-MCNC: 9.3 MG/DL (ref 8.5–10.5)
CHLORIDE SERPL-SCNC: 100 MEQ/L (ref 98–111)
CO2 SERPL-SCNC: 27 MEQ/L (ref 23–33)
CREAT SERPL-MCNC: 0.8 MG/DL (ref 0.4–1.2)
DEPRECATED RDW RBC AUTO: 43.8 FL (ref 35–45)
EOSINOPHIL NFR BLD AUTO: 5.7 %
EOSINOPHILS ABSOLUTE: 0.5 THOU/MM3 (ref 0–0.4)
ERYTHROCYTE [DISTWIDTH] IN BLOOD BY AUTOMATED COUNT: 13.7 % (ref 11.5–14.5)
GFR SERPL CREATININE-BSD FRML MDRD: > 60 ML/MIN/1.73M2
GLUCOSE SERPL-MCNC: 139 MG/DL (ref 70–108)
HCT VFR BLD AUTO: 46.6 % (ref 42–52)
HGB BLD-MCNC: 14.6 GM/DL (ref 14–18)
IMM GRANULOCYTES # BLD AUTO: 0.02 THOU/MM3 (ref 0–0.07)
IMM GRANULOCYTES NFR BLD AUTO: 0.2 %
LYMPHOCYTES ABSOLUTE: 2.1 THOU/MM3 (ref 1–4.8)
LYMPHOCYTES NFR BLD AUTO: 25.5 %
MCH RBC QN AUTO: 27.5 PG (ref 26–33)
MCHC RBC AUTO-ENTMCNC: 31.3 GM/DL (ref 32.2–35.5)
MCV RBC AUTO: 87.9 FL (ref 80–94)
MONOCYTES ABSOLUTE: 0.5 THOU/MM3 (ref 0.4–1.3)
MONOCYTES NFR BLD AUTO: 6.4 %
NEUTROPHILS NFR BLD AUTO: 61.6 %
NRBC BLD AUTO-RTO: 0 /100 WBC
OSMOLALITY SERPL CALC.SUM OF ELEC: 278 MOSMOL/KG (ref 275–300)
PLATELET # BLD AUTO: 238 THOU/MM3 (ref 130–400)
PMV BLD AUTO: 9.6 FL (ref 9.4–12.4)
POTASSIUM SERPL-SCNC: 4.1 MEQ/L (ref 3.5–5.2)
PROT SERPL-MCNC: 6.8 G/DL (ref 6.1–8)
RBC # BLD AUTO: 5.3 MILL/MM3 (ref 4.7–6.1)
SEGMENTED NEUTROPHILS ABSOLUTE COUNT: 5.1 THOU/MM3 (ref 1.8–7.7)
SODIUM SERPL-SCNC: 138 MEQ/L (ref 135–145)
URATE SERPL-MCNC: 6.4 MG/DL (ref 3.7–7)
WBC # BLD AUTO: 8.3 THOU/MM3 (ref 4.8–10.8)

## 2023-03-01 PROCEDURE — 85025 COMPLETE CBC W/AUTO DIFF WBC: CPT

## 2023-03-01 PROCEDURE — 36415 COLL VENOUS BLD VENIPUNCTURE: CPT

## 2023-03-01 PROCEDURE — 73660 X-RAY EXAM OF TOE(S): CPT

## 2023-03-01 PROCEDURE — 80053 COMPREHEN METABOLIC PANEL: CPT

## 2023-03-01 PROCEDURE — 84550 ASSAY OF BLOOD/URIC ACID: CPT

## 2023-03-01 PROCEDURE — 6370000000 HC RX 637 (ALT 250 FOR IP): Performed by: STUDENT IN AN ORGANIZED HEALTH CARE EDUCATION/TRAINING PROGRAM

## 2023-03-01 PROCEDURE — 6360000002 HC RX W HCPCS: Performed by: STUDENT IN AN ORGANIZED HEALTH CARE EDUCATION/TRAINING PROGRAM

## 2023-03-01 RX ORDER — PREDNISONE 20 MG/1
40 TABLET ORAL DAILY
Qty: 10 TABLET | Refills: 0 | Status: SHIPPED | OUTPATIENT
Start: 2023-03-01 | End: 2023-03-06

## 2023-03-01 RX ORDER — PREDNISONE 20 MG/1
40 TABLET ORAL ONCE
Status: COMPLETED | OUTPATIENT
Start: 2023-03-01 | End: 2023-03-01

## 2023-03-01 RX ORDER — KETOROLAC TROMETHAMINE 30 MG/ML
15 INJECTION, SOLUTION INTRAMUSCULAR; INTRAVENOUS ONCE
Status: COMPLETED | OUTPATIENT
Start: 2023-03-01 | End: 2023-03-01

## 2023-03-01 RX ORDER — MORPHINE SULFATE 4 MG/ML
4 INJECTION, SOLUTION INTRAMUSCULAR; INTRAVENOUS ONCE
Status: COMPLETED | OUTPATIENT
Start: 2023-03-01 | End: 2023-03-01

## 2023-03-01 RX ORDER — IBUPROFEN 600 MG/1
600 TABLET ORAL EVERY 6 HOURS PRN
Qty: 24 TABLET | Refills: 0 | Status: SHIPPED | OUTPATIENT
Start: 2023-03-01 | End: 2023-03-08

## 2023-03-01 RX ADMIN — PREDNISONE 40 MG: 20 TABLET ORAL at 02:27

## 2023-03-01 RX ADMIN — MORPHINE SULFATE 4 MG: 4 INJECTION, SOLUTION INTRAMUSCULAR; INTRAVENOUS at 01:03

## 2023-03-01 RX ADMIN — KETOROLAC TROMETHAMINE 15 MG: 30 INJECTION, SOLUTION INTRAMUSCULAR at 01:02

## 2023-03-01 ASSESSMENT — PAIN DESCRIPTION - ORIENTATION: ORIENTATION: RIGHT

## 2023-03-01 ASSESSMENT — PAIN DESCRIPTION - LOCATION: LOCATION: FOOT

## 2023-03-01 ASSESSMENT — PAIN SCALES - GENERAL: PAINLEVEL_OUTOF10: 10

## 2023-03-01 NOTE — DISCHARGE INSTRUCTIONS
Your work-up today did not reveal any evidence of elevated white count or elevated uric acid. I have low clinical suspicion that this is in fact a skin infection and more so believe this to be an inflammatory arthritis such as pseudogout. You have strong pulses and I do not believe that this is due to decreased blood flow to your foot. There is no swelling to your calf and you are taking the Xarelto so I do not believe this is secondary to worsening blood clots. I have given you steroids in the emergency department and sent a prescription for steroids to go home with. I also sent a prescription for prescription strength Motrin to take 4 times a day with food for the next 5 days. Please call the podiatry number provided to schedule an appointment. You would probably benefit from arthrocentesis of the joint (aspiration of fluid) to further evaluate. If you begin to spike fevers or the redness worsens it is imperative that you return to the emergency department immediately for reevaluation.

## 2023-03-01 NOTE — ED TRIAGE NOTES
Pt presents to the ED from home with complaints of right foot pain and swelling. Pt states he has been having trouble with this foot for awhile. Pt states he was first diagnosed with cellulitis and was placed on antibiotics and then pt was diagnosed with blood clots. Pt states the pain has increasingly gotten worse. Pt rates pain a 10/10.

## 2023-03-01 NOTE — ED PROVIDER NOTES
[address] [facility] [address] [address] EMERGENCY DEPT      EMERGENCY MEDICINE     Pt Name: Gumaro Mckeon  MRN: [id number]  [place] 1980  Date of evaluation: 2/28/2023  Provider: Alexandria Hall MD    CHIEF COMPLAINT       Chief Complaint   Patient presents with    Foot Swelling     HISTORY OF PRESENT ILLNESS   Gumaro Mckeon is a pleasant 38 y.o. male who presents to the emergency department from from home, as a walk in to the ED lobby for evaluation of right great toe pain. Patient has been evaluated twice for this in the last 2 weeks. He was initially diagnosed with cellulitis and given a prescription for antibiotics. He was later diagnosed with a peroneal thrombus and started on Xarelto. Patient reports his symptoms improved however over the last 24 to 48 hours have recurred. He reports erythema and pain to the right MTP. He describes the pain as throbbing and localized. He denies ascending erythema. He denies fever or chills. He denies calf pain or swelling. He denies ankle pain. He denies trauma. PASTMEDICAL HISTORY   No past medical history on file. There is no problem list on file for this patient. SURGICAL HISTORY       Past Surgical History:   Procedure Laterality Date    TYMPANOSTOMY TUBE PLACEMENT         CURRENT MEDICATIONS       Discharge Medication List as of 3/1/2023  2:23 AM        CONTINUE these medications which have NOT CHANGED    Details   rivaroxaban (XARELTO) 15 MG TABS tablet Take 1 tablet by mouth 2 times daily (with meals), Disp-42 tablet, R-1Provided coupon for this medication as pharmacy. Normal      gabapentin (NEURONTIN) 100 MG capsule Take 3 capsules by mouth 2 times daily, Disp-180 capsule, R-0      albuterol sulfate HFA (PROVENTIL HFA) 108 (90 BASE) MCG/ACT inhaler Inhale 2 puffs into the lungs every 4 hours as needed for Wheezing or Shortness of Breath (Space out to every 6 hours as symptoms improve) Space out to every 6 hours as symptoms improve., Disp-1 Inhaler, R-0 ALLERGIES     has No Known Allergies. FAMILY HISTORY     He indicated that his mother is alive. He indicated that his father is alive. SOCIAL HISTORY       Social History     Tobacco Use    Smoking status: Every Day     Packs/day: 1.00     Types: Cigarettes    Smokeless tobacco: Never   Substance Use Topics    Alcohol use: No    Drug use: No       PHYSICAL EXAM       ED Triage Vitals [02/28/23 2358]   BP Temp Temp Source Heart Rate Resp SpO2 Height Weight   135/81 98.7 °F (37.1 °C) Oral 100 18 97 % 5' 9\" (1.753 m) 200 lb (90.7 kg)       Additional Vital Signs:  Vitals:    02/28/23 2358   BP: 135/81   Pulse: 100   Resp: 18   Temp: 98.7 °F (37.1 °C)   SpO2: 97%     Physical Exam  Constitutional:  Well developed, well nourished, no acute distress, non-toxic appearance   Musculoskeletal: Pain with range of motion of the right great toe. No erythema overlying the toe itself. The erythema is surrounding the MTP. No calf tenderness or swelling. No ankle swelling or tenderness. Palpable dorsalis pedis and posterior tibialis pulses  Integument:  warmth to the right MTP, no induration, slight erythema  Lymphatic:  No lymphadenopathy noted   Neurologic:  Alert & oriented x 3, right lower extremity motor and sensation intact  Psychiatric:  Speech and behavior appropriate  FORMAL DIAGNOSTIC RESULTS     RADIOLOGY: Interpretation per the Radiologist below, if available at the time of this note (none if blank):    XR TOE RIGHT (MIN 2 VIEWS)   Final Result   1.  No acute findings      This document has been electronically signed by: Cynthia Posey DO    on 03/01/2023 01:40 AM          LABS: (none if blank)  Labs Reviewed   CBC WITH AUTO DIFFERENTIAL - Abnormal; Notable for the following components:       Result Value    MCHC 31.3 (*)     Eosinophils Absolute 0.5 (*)     All other components within normal limits   COMPREHENSIVE METABOLIC PANEL - Abnormal; Notable for the following components:    Glucose 139 (*)     Total Bilirubin 0.2 (*)     All other components within normal limits   URIC ACID   ANION GAP   GLOMERULAR FILTRATION RATE, ESTIMATED   OSMOLALITY       (Any cultures that may have been sent were not resulted at the time of this patient visit)    [address] / ED COURSE:     1) Number and Complexity of Problems            Problem List This Visit:         Chief Complaint   Patient presents with    Foot Swelling            Differential Diagnosis includes (but not limited to):  Cellulitis, inflammatory arthritis        Diagnoses Considered but I have low suspicion of:   Septic joint, ischemic limb, worsening DVT             Pertinent Comorbid Conditions:    None    2)  Data Reviewed (none if left blank)          My Independent interpretations:       Labs:      Unremarkable                 Decision Rules/Clinical Scores utilized: None            External Documentation Reviewed:         Previous patient encounter documents & history available on EMR was reviewed              See Formal Diagnostic Results above for the lab and radiology tests and orders. 3)  Treatment and Disposition         ED Reassessment: Multiple differentials however the most likely is inflammatory arthritis. Considering negative uric acid x2 this is likely pseudogout. The presentation is not clinically consistent with cellulitis. Point-of-care ultrasound without evidence of cobblestoning. Palpable pulses with normal capillary refill with low clinical concern for ischemic limb. No calf swelling or tenderness and a negative Homans' sign overall reassuring against deep venous thrombosis propagation and the patient is already anticoagulated. Patient has yet to be treated with steroids. Will attempt a trial of steroids. Close podiatry follow-up was recommended with strict return to ED precautions.          Case discussed with consulting clinician: None         Shared Decision-Making was performed and disposition discussed with the        Patient/Family and questions answered          Social determinants of health impacting treatment or disposition: None         Code Status: Did not discuss      Summary of Patient Presentation:      HECTOR  /   Jeannie Mitchell Reviewed:    Vitals:    02/28/23 2358   BP: 135/81   Pulse: 100   Resp: 18   Temp: 98.7 °F (37.1 °C)   TempSrc: Oral   SpO2: 97%   Weight: 200 lb (90.7 kg)   Height: 5' 9\" (1.753 m)       The patient was seen and examined. Appropriate diagnostic testing was performed and results reviewed with the patient. The results of pertinent diagnostic studies and exam findings were discussed. The patients provisional diagnosis and plan of care were discussed with the patient and present family who expressed understanding. Any medications were reviewed and indications and risks of medications were discussed with the patient /family present. Strict verbal and written return precautions, instructions and appropriate follow-up provided to  the patient. ED Medications administered this visit:  (None if blank)  Medications   ketorolac (TORADOL) injection 15 mg (15 mg IntraVENous Given 3/1/23 0102)   morphine injection 4 mg (4 mg IntraVENous Given 3/1/23 0103)   predniSONE (DELTASONE) tablet 40 mg (40 mg Oral Given 3/1/23 0227)         PROCEDURES: (None if blank)  Procedures:     CRITICAL CARE: (None if blank)      DISCHARGE PRESCRIPTIONS: (None if blank)  Discharge Medication List as of 3/1/2023  2:23 AM        START taking these medications    Details   ibuprofen (IBU) 600 MG tablet Take 1 tablet by mouth every 6 hours as needed for Pain, Disp-24 tablet, R-0Normal      predniSONE (DELTASONE) 20 MG tablet Take 2 tablets by mouth daily for 5 days, Disp-10 tablet, R-0Normal             FINAL IMPRESSION      1.  Inflammatory arthritis          DISPOSITION/PLAN   DISPOSITION Decision To Discharge 03/01/2023 02:32:01 AM      OUTPATIENT FOLLOW UP THE PATIENT:  Anival Thomas, [address] [place] [address]  [phone]    Schedule an appointment as soon as possible for a visit in 1 day        MD Arleen Figueredo MD  03/01/23 7891

## 2024-06-06 ENCOUNTER — OFFICE VISIT (OUTPATIENT)
Dept: FAMILY MEDICINE CLINIC | Age: 44
End: 2024-06-06

## 2024-06-06 VITALS
HEIGHT: 69 IN | DIASTOLIC BLOOD PRESSURE: 70 MMHG | TEMPERATURE: 97.7 F | SYSTOLIC BLOOD PRESSURE: 110 MMHG | RESPIRATION RATE: 16 BRPM | WEIGHT: 205.6 LBS | BODY MASS INDEX: 30.45 KG/M2 | HEART RATE: 78 BPM

## 2024-06-06 DIAGNOSIS — M79.89 SWELLING OF RIGHT FOOT: ICD-10-CM

## 2024-06-06 DIAGNOSIS — M79.661 PAIN AND SWELLING OF LOWER LEG, RIGHT: Primary | ICD-10-CM

## 2024-06-06 DIAGNOSIS — Z30.09 ENCOUNTER FOR VASECTOMY ASSESSMENT: ICD-10-CM

## 2024-06-06 DIAGNOSIS — M79.89 PAIN AND SWELLING OF LOWER LEG, RIGHT: Primary | ICD-10-CM

## 2024-06-06 DIAGNOSIS — Z86.718 HX OF DEEP VENOUS THROMBOSIS: ICD-10-CM

## 2024-06-06 DIAGNOSIS — Z13.220 SCREENING FOR LIPID DISORDERS: ICD-10-CM

## 2024-06-06 DIAGNOSIS — Z11.4 SCREENING FOR HIV WITHOUT PRESENCE OF RISK FACTORS: ICD-10-CM

## 2024-06-06 DIAGNOSIS — Z13.1 SCREENING FOR DIABETES MELLITUS: ICD-10-CM

## 2024-06-06 DIAGNOSIS — Z11.59 ENCOUNTER FOR HEPATITIS C SCREENING TEST FOR LOW RISK PATIENT: ICD-10-CM

## 2024-06-06 PROCEDURE — 99204 OFFICE O/P NEW MOD 45 MIN: CPT | Performed by: NURSE PRACTITIONER

## 2024-06-06 SDOH — ECONOMIC STABILITY: FOOD INSECURITY: WITHIN THE PAST 12 MONTHS, YOU WORRIED THAT YOUR FOOD WOULD RUN OUT BEFORE YOU GOT MONEY TO BUY MORE.: NEVER TRUE

## 2024-06-06 SDOH — ECONOMIC STABILITY: INCOME INSECURITY: HOW HARD IS IT FOR YOU TO PAY FOR THE VERY BASICS LIKE FOOD, HOUSING, MEDICAL CARE, AND HEATING?: NOT HARD AT ALL

## 2024-06-06 SDOH — ECONOMIC STABILITY: HOUSING INSECURITY
IN THE LAST 12 MONTHS, WAS THERE A TIME WHEN YOU DID NOT HAVE A STEADY PLACE TO SLEEP OR SLEPT IN A SHELTER (INCLUDING NOW)?: NO

## 2024-06-06 SDOH — ECONOMIC STABILITY: FOOD INSECURITY: WITHIN THE PAST 12 MONTHS, THE FOOD YOU BOUGHT JUST DIDN'T LAST AND YOU DIDN'T HAVE MONEY TO GET MORE.: NEVER TRUE

## 2024-06-06 ASSESSMENT — PATIENT HEALTH QUESTIONNAIRE - PHQ9
10. IF YOU CHECKED OFF ANY PROBLEMS, HOW DIFFICULT HAVE THESE PROBLEMS MADE IT FOR YOU TO DO YOUR WORK, TAKE CARE OF THINGS AT HOME, OR GET ALONG WITH OTHER PEOPLE: NOT DIFFICULT AT ALL
SUM OF ALL RESPONSES TO PHQ QUESTIONS 1-9: 11
2. FEELING DOWN, DEPRESSED OR HOPELESS: SEVERAL DAYS
6. FEELING BAD ABOUT YOURSELF - OR THAT YOU ARE A FAILURE OR HAVE LET YOURSELF OR YOUR FAMILY DOWN: NOT AT ALL
1. LITTLE INTEREST OR PLEASURE IN DOING THINGS: MORE THAN HALF THE DAYS
7. TROUBLE CONCENTRATING ON THINGS, SUCH AS READING THE NEWSPAPER OR WATCHING TELEVISION: MORE THAN HALF THE DAYS
SUM OF ALL RESPONSES TO PHQ QUESTIONS 1-9: 11
SUM OF ALL RESPONSES TO PHQ QUESTIONS 1-9: 11
SUM OF ALL RESPONSES TO PHQ9 QUESTIONS 1 & 2: 3
5. POOR APPETITE OR OVEREATING: MORE THAN HALF THE DAYS
8. MOVING OR SPEAKING SO SLOWLY THAT OTHER PEOPLE COULD HAVE NOTICED. OR THE OPPOSITE, BEING SO FIGETY OR RESTLESS THAT YOU HAVE BEEN MOVING AROUND A LOT MORE THAN USUAL: NOT AT ALL
SUM OF ALL RESPONSES TO PHQ QUESTIONS 1-9: 11
4. FEELING TIRED OR HAVING LITTLE ENERGY: NEARLY EVERY DAY
3. TROUBLE FALLING OR STAYING ASLEEP: SEVERAL DAYS

## 2024-06-06 NOTE — PROGRESS NOTES
[facility]  [facility]  [address]  [facility] [address]  Dept: [phone]  Loc: [phone]      Visit Date: 6/6/2024    Edgar Rendon is a 44 y.o. male who presents today for:  Chief Complaint   Patient presents with    New Patient     Pt here for np, states has blood clots, went to ER 2024     HPI:     Here to establish care. Previous PCP was None    Specialist:  None    Medications:  None    Family history:  Mother- CVA    Tobacco Use:  1 pack per day - started smoking at 15  Marijuana occasionally  ETOH: None      Blood clots:  Diagnosed 2/4/2023 with DVT - had 30 days of Xarelto - could not afford the RX so he went without. went to the ER 1/29/23 with c/o righ tfoot pain and swelling x 8 days prior - dx with cellulitis - given Keflex, Bactrim, and Norco. Went back to the ER 2/4/23 - was not getting better with antibiotics - had doppler done:  Narrative & Impression  PROCEDURE: VL DUP LOWER EXTREMITY VENOUS RIGHT     TECHNIQUE: Venous doppler ultrasound was performed of the right lower extremity using gray scale, color flow and spectral doppler imaging.     FINDINGS:     There is normal color flow, spectral analysis and compressibility of the right common femoral vein, femoral vein and popliteal veins..     There is normal color flow and compressibility in the right posterior tibial and anterior tibial veins. There is possible thrombus in the right  peroneal veins.     There is normal color flow, spectral analysis and compressibility in the left common femoral vein.     IMPRESSION:     1. Possible thrombus in the right peroneal vein.  2. Otherwise negative right lower extremity venous ultrasound.    XR ANKLE RIGHT (MIN 3 VIEWS)     Result Date: 2/4/2023  Right ankle x-ray: 3 views. Indication: Pain and swelling. Comparison: None Findings: No acute fracture or dislocation. The tibiotalar joint is congruent. No widening of the medial or lateral clear spaces.

## 2024-06-19 ENCOUNTER — HOSPITAL ENCOUNTER (OUTPATIENT)
Dept: INTERVENTIONAL RADIOLOGY/VASCULAR | Age: 44
Discharge: HOME | End: 2024-06-21
Payer: COMMERCIAL

## 2024-06-19 DIAGNOSIS — Z86.718 HX OF DEEP VENOUS THROMBOSIS: ICD-10-CM

## 2024-06-19 PROCEDURE — 93971 EXTREMITY STUDY: CPT

## 2024-07-09 ENCOUNTER — TELEPHONE (OUTPATIENT)
Dept: FAMILY MEDICINE CLINIC | Age: 44
End: 2024-07-09

## 2024-07-09 NOTE — TELEPHONE ENCOUNTER
Labs are okay overall - vitamin D low at 13.1:    Can either start an over the counter vitamin D3 - 5000 IUs once daily OR can send a prescription for weekly vitamin D supplement.  If patient prefers weekly supplementation over daily, can send vitamin D3 50,000 IU - take 1 tablet weekly. #4 with 11 refills. Recheck vitamin D3 levels 3 months after taking supplement - DX: Vitamin D deficiency.     Glucose was slightly elevated at 100 as well, we can recheck this at next office visit

## 2024-07-09 NOTE — TELEPHONE ENCOUNTER
Faxed results received from [facility] and there were several results missing from what was originally ordered by GAYLA on 6/6/24. Missing labs are FLP, CMP, TSH with reflex, HFP and HIV. There are also labs that were performed that were not ordered: Hep B panel, BMP, Hep B core antibody IGM and HIV 2 by PCR. I called [facility] at [phone] and spoke to Janneth ESCALANTE and she stated that she will credit the pts account for the tests that were not ordered and will generate a recollect order to the local [facility] where the pt had the testing done that he still needs to have the FLP, CMP, TSH with reflex and HFP drawn; no need for new orders to be given or faxed by the office. Called pt to notify him of the above but could not leave a message as his VM was full.     Lab results scanned into the chart for review. Please advise.

## 2024-09-06 NOTE — TELEPHONE ENCOUNTER
Pt was never notified of these lab results.    Contacted [facility] ([phone]) and spoke to Brynn REYES and she stated the pt never came back to have the following labs redrawn: FLP, CMP, TSH with reflex, HFP and HIV. They have orders ready for him to get done at any [facility] and she stated that his account was credited for the testing that was performed but not ordered. Called pt but was unable to leave a message as his VM is full. My Chart message sent to pt asking him to contact the office.

## 2025-05-07 ENCOUNTER — TRANSCRIBE ORDERS (OUTPATIENT)
Dept: ADMINISTRATIVE | Age: 45
End: 2025-05-07

## 2025-05-07 DIAGNOSIS — I70.213 ATHEROSCLEROSIS OF NATIVE ARTERY OF BOTH LOWER EXTREMITIES WITH INTERMITTENT CLAUDICATION: Primary | ICD-10-CM

## 2025-06-23 ENCOUNTER — HOSPITAL ENCOUNTER (OUTPATIENT)
Dept: INTERVENTIONAL RADIOLOGY/VASCULAR | Age: 45
Discharge: HOME | End: 2025-06-25
Attending: PODIATRIST
Payer: COMMERCIAL

## 2025-06-23 DIAGNOSIS — I70.213 ATHEROSCLEROSIS OF NATIVE ARTERY OF BOTH LOWER EXTREMITIES WITH INTERMITTENT CLAUDICATION: ICD-10-CM

## 2025-06-23 PROCEDURE — 93925 LOWER EXTREMITY STUDY: CPT
